# Patient Record
Sex: FEMALE | Race: WHITE | NOT HISPANIC OR LATINO | Employment: FULL TIME | URBAN - METROPOLITAN AREA
[De-identification: names, ages, dates, MRNs, and addresses within clinical notes are randomized per-mention and may not be internally consistent; named-entity substitution may affect disease eponyms.]

---
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
## 2025-05-22 ENCOUNTER — APPOINTMENT (OUTPATIENT)
Dept: PRIMARY CARE | Facility: CLINIC | Age: 20
End: 2025-05-22
Payer: COMMERCIAL

## 2025-05-22 VITALS
TEMPERATURE: 97.3 F | SYSTOLIC BLOOD PRESSURE: 118 MMHG | HEIGHT: 63 IN | WEIGHT: 202.2 LBS | HEART RATE: 66 BPM | DIASTOLIC BLOOD PRESSURE: 78 MMHG | OXYGEN SATURATION: 99 % | BODY MASS INDEX: 35.83 KG/M2

## 2025-05-22 DIAGNOSIS — G43.009 MIGRAINE WITHOUT AURA AND WITHOUT STATUS MIGRAINOSUS, NOT INTRACTABLE: ICD-10-CM

## 2025-05-22 DIAGNOSIS — J98.01 BRONCHOSPASM: Primary | ICD-10-CM

## 2025-05-22 DIAGNOSIS — M25.50 ARTHRALGIA, UNSPECIFIED JOINT: ICD-10-CM

## 2025-05-22 PROCEDURE — 3008F BODY MASS INDEX DOCD: CPT | Performed by: INTERNAL MEDICINE

## 2025-05-22 PROCEDURE — 99214 OFFICE O/P EST MOD 30 MIN: CPT | Performed by: INTERNAL MEDICINE

## 2025-05-22 PROCEDURE — 1036F TOBACCO NON-USER: CPT | Performed by: INTERNAL MEDICINE

## 2025-05-22 RX ORDER — SUMATRIPTAN SUCCINATE 25 MG/1
25 TABLET ORAL ONCE AS NEEDED
Qty: 9 TABLET | Refills: 11 | Status: SHIPPED | OUTPATIENT
Start: 2025-05-22 | End: 2025-09-07

## 2025-05-22 RX ORDER — ALBUTEROL SULFATE 90 UG/1
INHALANT RESPIRATORY (INHALATION)
COMMUNITY
Start: 2025-04-29

## 2025-05-22 ASSESSMENT — ENCOUNTER SYMPTOMS
ABDOMINAL PAIN: 0
DIARRHEA: 0
VOMITING: 0
WHEEZING: 0
HEADACHES: 1
NAUSEA: 0
BACK PAIN: 1
COUGH: 0
FEVER: 0
CHILLS: 0
CONSTIPATION: 0
ARTHRALGIAS: 1
SHORTNESS OF BREATH: 0
PALPITATIONS: 0

## 2025-05-22 ASSESSMENT — PATIENT HEALTH QUESTIONNAIRE - PHQ9
1. LITTLE INTEREST OR PLEASURE IN DOING THINGS: NOT AT ALL
SUM OF ALL RESPONSES TO PHQ9 QUESTIONS 1 AND 2: 0
2. FEELING DOWN, DEPRESSED OR HOPELESS: NOT AT ALL

## 2025-05-22 NOTE — PROGRESS NOTES
CHIEF COMPLAINT  [facility]    HISTORY OF PRESENT ILLNESS  Kiersten Gaming is a 19 y.o. female presents today for follow up of [facility]    HPI    Patient is here today with her mother, to \[facility] [place] [facility]\"".  History reviewed and updated.    Was sick in April  Tested positive for Strep 4/6/25, treated for amoxicillin.  4/10 - steroid injection & steroids  4/17 - given more steroids, Zpak and cough medication.     Was tested early on for COVID, maybe also flu and RSV, nothing was positive.   First 3 visits were at urgent care, 4th at [facility] ER.     Friend and coworker tested positive for TB.  She was worried, went to ER. Tested negative for TB.  Rx for albuterol.  Was told xray was clear.     Did not have any problems prior to April.   Getting some pains in her back now.  Does not still have cough.   Still has some burning in her chest with activity at work.   Denies smoking & vaping.     Joint pains, particularly when cold - worst in knees.  Has so sleep with heated blanket and two comforters.    Sometimes in so much pain in knees that she gets up in middle of night to take a hot bath.  Pain in hands, not as severe.  Occasional stiffness.  Denies rashes, sores / ulcers.     REVIEW OF SYSTEMS  Review of Systems   Constitutional:  Negative for chills and fever.   Respiratory:  Negative for cough, shortness of breath and wheezing.         Previous wheezing, now occasional burning in chest.    Cardiovascular:  Negative for palpitations and leg swelling.   Gastrointestinal:  Negative for abdominal pain, constipation, diarrhea, nausea and vomiting.   Musculoskeletal:  Positive for arthralgias and back pain.   Skin:  Negative for rash.   Neurological:  Positive for headaches.       ALLERGIES  [place], Naproxen, and Poison sumac extract    MEDICATIONS  Current Outpatient Medications   Medication Instructions    albuterol 90 mcg/actuation inhaler INHALE 2 PUFFS BY MOUTH EVERY 4 HOURS AS NEEDED FOR DYSPNEA    SUMAtriptan  "(IMITREX) 25 mg, oral, Once as needed, May repeat dose once in 2 hours if no relief.  Do not exceed 2 doses in 24 hours.       TOBACCO USE  Tobacco Use History[1]    DEPRESSION SCREEN  Over the past 2 weeks, how often have you been bothered by any of the following problems?  Little interest or pleasure in doing things: Not at all  Feeling down, depressed, or hopeless: Not at all    SURGICAL HISTORY  No past surgical history on file.       OBJECTIVE    /78   Pulse 66   Temp 36.3 °C (97.3 °F)   Ht 1.6 m (5' 3\")   Wt 91.7 kg (202 lb 3.2 oz)   SpO2 99%   BMI 35.82 kg/m²    BMI: Estimated body mass index is 35.82 kg/m² as calculated from the following:    Height as of this encounter: 1.6 m (5' 3\").    Weight as of this encounter: 91.7 kg (202 lb 3.2 oz).    BP Readings from Last 3 Encounters:   05/22/25 118/78   10/22/24 120/80   02/13/24 113/79      Wt Readings from Last 3 Encounters:   05/22/25 91.7 kg (202 lb 3.2 oz) (98%, Z= 1.97)*   10/22/24 87.5 kg (193 lb) (97%, Z= 1.84)*   02/13/24 83.8 kg (184 lb 11.2 oz) (96%, Z= 1.73)*     * Growth percentiles are based on [facility] (Girls, 2-20 Years) data.        PHYSICAL EXAM  Physical Exam  Constitutional:       Appearance: Normal appearance.   HENT:      Head: Normocephalic and atraumatic.      Right Ear: Tympanic membrane and ear canal normal.      Left Ear: Tympanic membrane and ear canal normal.   Cardiovascular:      Rate and Rhythm: Normal rate and regular rhythm.      Pulses: Normal pulses.      Heart sounds: No murmur heard.  Pulmonary:      Effort: Pulmonary effort is normal. No respiratory distress.      Breath sounds: Normal breath sounds. No wheezing.   Abdominal:      General: There is no distension.      Palpations: Abdomen is soft.      Tenderness: There is no abdominal tenderness.   Musculoskeletal:      Right lower leg: No edema.      Left lower leg: No edema.      Comments: No swelling or deformity of hands/fingers  Good flexion extension at knees, " no joint line tenderness or effusion.    Skin:     Findings: No rash.   Neurological:      General: No focal deficit present.      Mental Status: She is alert and oriented to person, place, and time. Mental status is at baseline.   Psychiatric:         Mood and Affect: Mood normal.         Behavior: Behavior normal.         Thought Content: Thought content normal.         Judgment: Judgment normal.          ASSESSMENT AND PLAN  Assessment/Plan   Problem List Items Addressed This Visit       Migraine headache    Relevant Medications    SUMAtriptan (Imitrex) 25 mg tablet     Other Visit Diagnoses         Bronchospasm    -  Primary    Relevant Orders    Complete Pulmonary Function Test (Spirometry/DLCO/Lung Volumes)      Arthralgia, unspecified joint        Relevant Orders    CBC and Auto Differential    Comprehensive Metabolic Panel    Sedimentation Rate    C-Reactive Protein    Rheumatoid factor    Citrulline Antibody, IgG    GIOVANY with Reflex to PACO    Hemoglobin A1c          Bronchospasm - Treated for positive Strep early April, also sounds like she had another viral URI around the same time, requiring multiple courses of steroids and also inhaler.  Symptoms are mostly resolved, no longer has cough.  Had CXR reportedly clear.  TB exposure, states testing was negative.  Possible URI-induced bronchospasm.  She is now feeling a lot better.  PFT's order to assess for underlying asthma.     Migraine headaches - sumatriptan PRN helps, Rx sent.    Arthralgias, worse in knees, exacerbated by cold exposure - labs ordered to assess for underlying autoimmune disease.  Patient is aware that positive result will require additional evaluation and that false positives may occur.    Routine labs.    Follow-up 3 months.        [1]   Social History  Tobacco Use   Smoking Status Never   Smokeless Tobacco Never

## 2025-05-23 DIAGNOSIS — R74.8 ELEVATED LIVER ENZYMES: Primary | ICD-10-CM

## 2025-05-23 DIAGNOSIS — M25.50 DIFFUSE ARTHRALGIA: ICD-10-CM

## 2025-05-24 LAB
ALBUMIN SERPL-MCNC: 4.9 G/DL (ref 3.6–5.1)
ALP SERPL-CCNC: 79 U/L (ref 36–128)
ALT SERPL-CCNC: 78 U/L (ref 5–32)
ANA SER QL IF: NEGATIVE
ANION GAP SERPL CALCULATED.4IONS-SCNC: 11 MMOL/L (CALC) (ref 7–17)
AST SERPL-CCNC: 35 U/L (ref 12–32)
BASOPHILS # BLD AUTO: 83 CELLS/UL (ref 0–200)
BASOPHILS NFR BLD AUTO: 1 %
BILIRUB SERPL-MCNC: 0.4 MG/DL (ref 0.2–1.1)
BUN SERPL-MCNC: 13 MG/DL (ref 7–20)
CALCIUM SERPL-MCNC: 9.8 MG/DL (ref 8.9–10.4)
CCP IGG SERPL-ACNC: <16 UNITS
CHLORIDE SERPL-SCNC: 103 MMOL/L (ref 98–110)
CO2 SERPL-SCNC: 26 MMOL/L (ref 20–32)
CREAT SERPL-MCNC: 0.62 MG/DL (ref 0.5–0.96)
CRP SERPL-MCNC: <3 MG/L
EGFRCR SERPLBLD CKD-EPI 2021: 131 ML/MIN/1.73M2
EOSINOPHIL # BLD AUTO: 448 CELLS/UL (ref 15–500)
EOSINOPHIL NFR BLD AUTO: 5.4 %
ERYTHROCYTE [DISTWIDTH] IN BLOOD BY AUTOMATED COUNT: 13.7 % (ref 11–15)
ERYTHROCYTE [SEDIMENTATION RATE] IN BLOOD BY WESTERGREN METHOD: 9 MM/H
EST. AVERAGE GLUCOSE BLD GHB EST-MCNC: 117 MG/DL
EST. AVERAGE GLUCOSE BLD GHB EST-SCNC: 6.5 MMOL/L
GLUCOSE SERPL-MCNC: 68 MG/DL (ref 65–99)
HBA1C MFR BLD: 5.7 %
HCT VFR BLD AUTO: 40.7 % (ref 35–45)
HGB BLD-MCNC: 13 G/DL (ref 11.7–15.5)
LYMPHOCYTES # BLD AUTO: 2888 CELLS/UL (ref 850–3900)
LYMPHOCYTES NFR BLD AUTO: 34.8 %
MCH RBC QN AUTO: 27.7 PG (ref 27–33)
MCHC RBC AUTO-ENTMCNC: 31.9 G/DL (ref 32–36)
MCV RBC AUTO: 86.8 FL (ref 80–100)
MONOCYTES # BLD AUTO: 589 CELLS/UL (ref 200–950)
MONOCYTES NFR BLD AUTO: 7.1 %
NEUTROPHILS # BLD AUTO: 4291 CELLS/UL (ref 1500–7800)
NEUTROPHILS NFR BLD AUTO: 51.7 %
PLATELET # BLD AUTO: 256 THOUSAND/UL (ref 140–400)
PMV BLD REES-ECKER: 11.3 FL (ref 7.5–12.5)
POTASSIUM SERPL-SCNC: 4.1 MMOL/L (ref 3.8–5.1)
PROT SERPL-MCNC: 7.5 G/DL (ref 6.3–8.2)
RBC # BLD AUTO: 4.69 MILLION/UL (ref 3.8–5.1)
RHEUMATOID FACT SERPL-ACNC: <10 IU/ML
SODIUM SERPL-SCNC: 140 MMOL/L (ref 135–146)
WBC # BLD AUTO: 8.3 THOUSAND/UL (ref 3.8–10.8)

## 2025-05-26 PROBLEM — M25.50 DIFFUSE ARTHRALGIA: Status: ACTIVE | Noted: 2025-05-26

## 2025-05-26 PROBLEM — R74.8 ELEVATED LIVER ENZYMES: Status: ACTIVE | Noted: 2025-05-26

## 2025-06-19 PROBLEM — E16.2 HYPOGLYCEMIA: Status: ACTIVE | Noted: 2025-06-19

## 2025-06-19 NOTE — PROGRESS NOTES
Nutrition Initial Assessment:     Patient Kiersten Gaming is a 19 y.o. female being seen via virtual visit, phone call only who was referred by     Nancy Lu MD    on 11/6/2024 for   1. Hypoglycemia            {Is this a telehealth visit (virtual and/or phone only)? If yes, select drop down and complete the mandatory information. If this is not a telehealth visit, you can skip this and it will appear as a blank space in your final note.:58286}    Nutrition Assessment    Problem List[1]    Nutrition History:  Food & Nutrition History:   Food Allergies: {Food Allergies:60245:x};  Food Intolerances: {Food Intolerance:27072:x}  Vitamin/mineral intake: {Vitamin and Mineral Intake (Optional):69313}  Herbal supplements: {Herbal supplements (Optional):67919}  Medication and Complementary/Alternative Medicine Use:   GI Symptoms: {GI Issues (Optional):54613}  Mouth Issues: {Oral Problems (Optional):42942}; Teeth Issues: {Dentition (Optional):96574}  Sleep Habits: {Sleep Habits:39499:x}    Diet Recall:  Meal 1:   Snack:   Meal 2:   Snack:  Meal 3:   Snack:  Food Variety: {Present/Absent (Optional):63916}  Oral Nutrition Supplement Use: {ONS choices (Optional):42760:x} {Frequency (Optional):27292}  Fluid Intake:   Energy Intake: {Energy Intake (Optional):37509}  {Nutrition Specialties Section. Does the pt need info charted for TF, TPN, OB, Mindful Eating, DM, Disordered Eating)? If Yes, click this smart list. If not, skip & will appear as blank space in note. (Optional):82612}    Food Preparation:  Cooking: {Food preperation (Optional):90244}  Grocery Shopping: {Food preperation (Optional):27434}  Dining Out: {frequency (Optional):16433}    Physical Activity:       Food Insecurity: {Present/Absent (Optional):79514}    Anthropometrics:                            Weight History:   Daily Weight  05/22/25 : 91.7 kg (202 lb 3.2 oz) (98%, Z= 1.97)*  10/22/24 : 87.5 kg (193 lb) (97%, Z= 1.84)*  02/13/24 : 83.8 kg (184 lb 11.2 oz)  (96%, Z= 1.73)*    * Growth percentiles are based on CDC (Girls, 2-20 Years) data.  Weight Change %:       Nutrition Focused Physical Exam Findings:  Subcutaneous Fat Loss:        Muscle Wasting:       Physical Findings:  Hair:    Eyes:    Nails:    Skin:    Respiratory:    Edema:        Nutrition Significant Labs:  {Outpatient RDN Lab Lists (Optional):96825}    Medications:  Current Outpatient Medications   Medication Instructions    albuterol 90 mcg/actuation inhaler INHALE 2 PUFFS BY MOUTH EVERY 4 HOURS AS NEEDED FOR DYSPNEA    SUMAtriptan (IMITREX) 25 mg, oral, Once as needed, May repeat dose once in 2 hours if no relief.  Do not exceed 2 doses in 24 hours.        Estimated Needs:   ;     ;     ;     ;                    Nutrition Diagnosis                Nutrition Interventions/Recommendations   Nutrition Prescription:         Nutrition Interventions:   Food and Nutrient Delivery:         Coordination of Care:       Nutrition Education:         Nutrition Education Topics Discussed:   ***    Educational Handouts Provided: {SR Handouts List:64743}     Nutrition Counseling:      Patient Goals:       Readiness to Change: {OPNOTE RDN assessment of patient:87659}   Level of Understanding: {OPNOTE RDN assessment of patient:69055}  Anticipated Compliance: {OPNOTE RDN assessment of patient:28240}       Nutrition Monitoring and Evaluation                                    Follow Up: {OPNOTEFOLLOWUP (Optional):31874}             [1]   Patient Active Problem List  Diagnosis    Frequent nosebleeds    Menorrhagia with irregular cycle    Migraine headache    Sleep disturbance    Annual physical exam    Elevated liver enzymes    Diffuse arthralgia    Hypoglycemia

## 2025-06-23 ENCOUNTER — APPOINTMENT (OUTPATIENT)
Dept: NUTRITION | Facility: HOSPITAL | Age: 20
End: 2025-06-23
Payer: COMMERCIAL

## 2025-06-23 DIAGNOSIS — E16.2 HYPOGLYCEMIA: Primary | ICD-10-CM

## 2025-07-09 ENCOUNTER — HOSPITAL ENCOUNTER (OUTPATIENT)
Dept: RESPIRATORY THERAPY | Facility: HOSPITAL | Age: 20
Discharge: HOME | End: 2025-07-09
Payer: COMMERCIAL

## 2025-07-09 DIAGNOSIS — J98.01 BRONCHOSPASM: ICD-10-CM

## 2025-07-09 LAB
MGC ASCENT PFT - FEV1 - PRE: 3.08
MGC ASCENT PFT - FEV1 - PREDICTED: 3
MGC ASCENT PFT - FVC - PRE: 3.78
MGC ASCENT PFT - FVC - PREDICTED: 3.35

## 2025-07-09 PROCEDURE — 94726 PLETHYSMOGRAPHY LUNG VOLUMES: CPT

## 2025-08-11 ENCOUNTER — APPOINTMENT (OUTPATIENT)
Dept: RHEUMATOLOGY | Facility: CLINIC | Age: 20
End: 2025-08-11
Payer: COMMERCIAL

## 2025-08-26 ENCOUNTER — APPOINTMENT (OUTPATIENT)
Dept: PRIMARY CARE | Facility: CLINIC | Age: 20
End: 2025-08-26
Payer: COMMERCIAL

## 2025-10-13 ENCOUNTER — APPOINTMENT (OUTPATIENT)
Dept: PRIMARY CARE | Facility: CLINIC | Age: 20
End: 2025-10-13
Payer: COMMERCIAL